# Patient Record
Sex: FEMALE | Race: BLACK OR AFRICAN AMERICAN | Employment: UNEMPLOYED | URBAN - METROPOLITAN AREA
[De-identification: names, ages, dates, MRNs, and addresses within clinical notes are randomized per-mention and may not be internally consistent; named-entity substitution may affect disease eponyms.]

---

## 2022-12-13 ENCOUNTER — OFFICE VISIT (OUTPATIENT)
Dept: INTERNAL MEDICINE CLINIC | Age: 27
End: 2022-12-13
Payer: MEDICAID

## 2022-12-13 VITALS
BODY MASS INDEX: 29.53 KG/M2 | RESPIRATION RATE: 18 BRPM | WEIGHT: 173 LBS | HEART RATE: 76 BPM | HEIGHT: 64 IN | TEMPERATURE: 97.5 F | SYSTOLIC BLOOD PRESSURE: 125 MMHG | DIASTOLIC BLOOD PRESSURE: 85 MMHG | OXYGEN SATURATION: 99 %

## 2022-12-13 DIAGNOSIS — Z11.59 ENCOUNTER FOR HEPATITIS C SCREENING TEST FOR LOW RISK PATIENT: ICD-10-CM

## 2022-12-13 DIAGNOSIS — Z23 NEEDS FLU SHOT: ICD-10-CM

## 2022-12-13 DIAGNOSIS — F41.8 DEPRESSION WITH ANXIETY: Primary | ICD-10-CM

## 2022-12-13 DIAGNOSIS — Z13.6 ENCOUNTER FOR SCREENING FOR CORONARY ARTERY DISEASE: ICD-10-CM

## 2022-12-13 PROCEDURE — 99203 OFFICE O/P NEW LOW 30 MIN: CPT | Performed by: INTERNAL MEDICINE

## 2022-12-13 PROCEDURE — 90686 IIV4 VACC NO PRSV 0.5 ML IM: CPT | Performed by: INTERNAL MEDICINE

## 2022-12-13 NOTE — PROGRESS NOTES
1. \"Have you been to the ER, urgent care clinic since your last visit? Hospitalized since your last visit? \" No    2. \"Have you seen or consulted any other health care providers outside of the [address] [place] [address] since your last visit? \" No     3. For patients aged 39-70: Has the patient had a colonoscopy / FIT/ Cologuard? NA - based on age      If the patient is female:    4. For patients aged 41-77: Has the patient had a mammogram within the past 2 years? NA - based on age or sex      11. For patients aged 21-65: Has the patient had a pap smear?  No

## 2022-12-13 NOTE — PROGRESS NOTES
HISTORY OF PRESENT ILLNESS  Joaquin Truong is a 32 y.o. female. Patient comes to establish PCP. Hx anxiety and depression on medications around 19years old. About 8 years ago she saw a therapist, CBT but did not follow up again. When asked about PMHx that would explain or contribute to her anxiety and depression, she is not sure. Hx depression and anxiety  Denied suicidal ideas or attempts. Always restless, hopeless, insomnia, low appetite. Not good concentration. Seasonal allergies  Claritin prn OTC. PAST MEDICAL HISTORY  Medical: depression and anxiety, seasonal allergies. Surgery: 4x c-sections. Allergies: iodine and shellfish. Meds: OTC claritin prn allergies. Work: Nurses aids. Family: father lung cancer/smoker, mother dialysis, HTN, strokes,, sister heart problem. Social: tobacco early teenage and stopped about 10 years ago, [place] 1 glass wine daily before bed, recreational drugs Marihuana daily. Sex:  and in other relation, 4 children, 1 partner male, STI trichomonas treated. Tubal ligation 2020. New Patient  Pertinent negatives include no chest pain and no shortness of breath. Establish Care  Pertinent negatives include no chest pain and no shortness of breath. Review of Systems   Constitutional:  Negative for chills and fever. HENT:  Negative for congestion. Respiratory:  Negative for cough and shortness of breath. Cardiovascular:  Negative for chest pain, palpitations and leg swelling. Visit Vitals  /85 (BP 1 Location: Left arm, BP Patient Position: Sitting, BP Cuff Size: Adult)   Pulse 76   Temp 97.5 °F (36.4 °C) (Temporal)   Resp 18   Ht 5' 4\" (1.626 m)   Wt 173 lb (78.5 kg)   LMP 12/13/2022 (Exact Date)   SpO2 99%   BMI 29.70 kg/m²     Physical Exam  Constitutional:       Appearance: Normal appearance. HENT:      Mouth/Throat:      Mouth: Mucous membranes are moist.      Pharynx: Oropharynx is clear.    Eyes:      Extraocular Movements: Extraocular movements intact. Conjunctiva/sclera: Conjunctivae normal.      Pupils: Pupils are equal, round, and reactive to light. Cardiovascular:      Rate and Rhythm: Normal rate and regular rhythm. Pulses: Normal pulses. Heart sounds: Normal heart sounds. Pulmonary:      Effort: Pulmonary effort is normal.      Breath sounds: Normal breath sounds. Abdominal:      General: Bowel sounds are normal.      Palpations: Abdomen is soft. Musculoskeletal:      Cervical back: Normal range of motion. Lymphadenopathy:      Cervical: No cervical adenopathy. Skin:     General: Skin is warm. Neurological:      Mental Status: She is alert and oriented to person, place, and time. Psychiatric:         Attention and Perception: Attention normal.         Mood and Affect: Mood normal. Mood is not anxious, depressed or elated. Affect is not labile, blunt, flat, angry, tearful or inappropriate. Speech: Speech is not delayed, slurred or tangential.         Thought Content: Thought content is not paranoid. Thought content does not include homicidal or suicidal ideation. Thought content does not include homicidal or suicidal plan. ASSESSMENT and PLAN    ICD-10-CM ICD-9-CM    1. Depression with anxiety  F41.8 300.4 REFERRAL TO PSYCHIATRY      2. Needs flu shot  Z23 V04.81 INFLUENZA, FLUARIX, FLULAVAL, FLUZONE (AGE 6 MO+), AFLURIA(AGE 3Y+) IM, PF, 0.5 ML      3. Encounter for hepatitis C screening test for low risk patient  Z11.59 V73.89 HEPATITIS C AB      4. Encounter for screening for coronary artery disease  Z13.6 V81.2 LIPID PANEL        Follow-up and Dispositions    Return in about 3 months (around 3/13/2023) for . Patient comes to establish PCP. Referral [facility] for depression and anxiety. PHQ-9 scored 19. Explained redflag, she would go to ED or call 911 if any present. FU in 3 months HM.

## 2022-12-13 NOTE — PATIENT INSTRUCTIONS
If having ideas of hurting yourself or other, or suicidal ideas, please call 911 or go to nearest ER.

## 2022-12-14 LAB
CHOLEST SERPL-MCNC: 126 MG/DL (ref 110–200)
HCV AB SER IA-ACNC: NORMAL
HDLC SERPL-MCNC: 3.1 MG/DL (ref 0–5)
HDLC SERPL-MCNC: 41 MG/DL
LDL/HDL RATIO,LDHD: 1.6
LDLC SERPL CALC-MCNC: 65 MG/DL (ref 50–99)
NON-HDL CHOLESTEROL, 011976: 85 MG/DL
TRIGL SERPL-MCNC: 98 MG/DL (ref 40–149)
VLDLC SERPL CALC-MCNC: 20 MG/DL (ref 8–30)

## 2023-05-17 ENCOUNTER — TELEPHONE (OUTPATIENT)
Facility: CLINIC | Age: 28
End: 2023-05-17

## 2023-06-07 ENCOUNTER — OFFICE VISIT (OUTPATIENT)
Facility: CLINIC | Age: 28
End: 2023-06-07
Payer: MEDICAID

## 2023-06-07 VITALS
TEMPERATURE: 97 F | DIASTOLIC BLOOD PRESSURE: 59 MMHG | OXYGEN SATURATION: 100 % | BODY MASS INDEX: 33.05 KG/M2 | HEIGHT: 64 IN | SYSTOLIC BLOOD PRESSURE: 107 MMHG | RESPIRATION RATE: 15 BRPM | HEART RATE: 90 BPM | WEIGHT: 193.6 LBS

## 2023-06-07 DIAGNOSIS — Z13.1 SCREENING FOR DIABETES MELLITUS (DM): ICD-10-CM

## 2023-06-07 DIAGNOSIS — Z13.6 ENCOUNTER FOR SCREENING FOR CORONARY ARTERY DISEASE: ICD-10-CM

## 2023-06-07 DIAGNOSIS — Z00.00 ANNUAL PHYSICAL EXAM: Primary | ICD-10-CM

## 2023-06-07 DIAGNOSIS — Z13.29 SCREENING FOR THYROID DISORDER: ICD-10-CM

## 2023-06-07 DIAGNOSIS — Z13.0 SCREENING FOR IRON DEFICIENCY ANEMIA: ICD-10-CM

## 2023-06-07 DIAGNOSIS — Z11.4 SCREENING FOR HIV (HUMAN IMMUNODEFICIENCY VIRUS): ICD-10-CM

## 2023-06-07 DIAGNOSIS — Z13.228 SCREENING FOR METABOLIC DISORDER: ICD-10-CM

## 2023-06-07 PROCEDURE — 99213 OFFICE O/P EST LOW 20 MIN: CPT | Performed by: INTERNAL MEDICINE

## 2023-06-07 RX ORDER — PRAZOSIN HYDROCHLORIDE 1 MG/1
1 CAPSULE ORAL NIGHTLY
COMMUNITY
Start: 2023-05-30

## 2023-06-07 RX ORDER — OXCARBAZEPINE 150 MG/1
150 TABLET, FILM COATED ORAL 2 TIMES DAILY
COMMUNITY
Start: 2023-05-30

## 2023-06-07 RX ORDER — BUSPIRONE HYDROCHLORIDE 15 MG/1
15 TABLET ORAL 3 TIMES DAILY
COMMUNITY
Start: 2023-05-30

## 2023-06-07 RX ORDER — HYDROXYZINE HYDROCHLORIDE 25 MG/1
25 TABLET, FILM COATED ORAL NIGHTLY
COMMUNITY
Start: 2023-05-30

## 2023-06-07 SDOH — ECONOMIC STABILITY: FOOD INSECURITY: WITHIN THE PAST 12 MONTHS, YOU WORRIED THAT YOUR FOOD WOULD RUN OUT BEFORE YOU GOT MONEY TO BUY MORE.: NEVER TRUE

## 2023-06-07 SDOH — ECONOMIC STABILITY: INCOME INSECURITY: HOW HARD IS IT FOR YOU TO PAY FOR THE VERY BASICS LIKE FOOD, HOUSING, MEDICAL CARE, AND HEATING?: NOT VERY HARD

## 2023-06-07 SDOH — ECONOMIC STABILITY: FOOD INSECURITY: WITHIN THE PAST 12 MONTHS, THE FOOD YOU BOUGHT JUST DIDN'T LAST AND YOU DIDN'T HAVE MONEY TO GET MORE.: NEVER TRUE

## 2023-06-07 SDOH — ECONOMIC STABILITY: HOUSING INSECURITY
IN THE LAST 12 MONTHS, WAS THERE A TIME WHEN YOU DID NOT HAVE A STEADY PLACE TO SLEEP OR SLEPT IN A SHELTER (INCLUDING NOW)?: NO

## 2023-06-07 ASSESSMENT — PATIENT HEALTH QUESTIONNAIRE - PHQ9: DEPRESSION UNABLE TO ASSESS: PT REFUSES

## 2023-06-07 ASSESSMENT — ENCOUNTER SYMPTOMS
SHORTNESS OF BREATH: 0
CONSTIPATION: 0
ABDOMINAL PAIN: 0
DIARRHEA: 0
COUGH: 0

## 2023-06-07 NOTE — PROGRESS NOTES
Subjective:      Patient ID: Horace Ayoub is a 25 y.o. female. Annual exam    Patient coming today for annual physical exam.  He did establish with behavioral care and psychiatric services in [place]. He has no other medications for anxiety and depression. He follows up regularly. He feels improvement of depression symptoms. She denied suicidal ideas of intention of hurting herself. She had no other complaints or concerns today. She denies fever chills or chest pain or shortness of breath. Anxiety and depression  [facility] in [place]. Denied suicidal ideas or attempts. OXcarbazepine (TRILEPTAL) 150 MG tablet, Take 1 tablet by mouth 2 times daily  prazosin (MINIPRESS) 1 MG capsule, Take 1 capsule by mouth at bedtime 3 tabs at bedtime  hydrOXYzine HCl (ATARAX) 25 MG tablet, Take 1 tablet by mouth at bedtime 1-2 tablets at bedtime prn  busPIRone (BUSPAR) 15 MG tablet, Take 15 mg by mouth 3 times daily    Seasonal allergies  Claritin prn OTC. PAST MEDICAL HISTORY  Medical: depression and anxiety, seasonal allergies. Surgery: 4x c-sections. Allergies: iodine and shellfish. Meds: OTC claritin prn allergies. Work: Nurses aids. Family: father lung cancer/smoker, mother dialysis, HTN, strokes,, sister heart problem. Social: tobacco early teenage and stopped about 10 years ago, [place] 1 glass wine daily before bed, recreational drugs Marihuana daily. Sex:  and in other relation, 4 children, 1 partner male, STI trichomonas treated. Tubal ligation 2020. Review of Systems   Constitutional:  Negative for chills and fever. HENT:  Negative for sneezing. Respiratory:  Negative for cough and shortness of breath. Cardiovascular:  Negative for chest pain and palpitations. Gastrointestinal:  Negative for abdominal pain, constipation and diarrhea.      Objective:   Visit Vitals  BP (!) 107/59   Pulse 90   Temp 97 °F (36.1 °C) (Temporal)   Resp 15   Ht 5' 4\" (1.626

## 2023-06-07 NOTE — PROGRESS NOTES
Cesario Clay is a 25 y.o.  female presents today for office visit for   Chief Complaint   Patient presents with    Health Maintenance     1. \"Have you been to the ER, urgent care clinic since your last visit? Hospitalized since your last visit? \" No    2. \"Have you seen or consulted any other health care providers outside of the [address] [place] [address] since your last visit? \" Seeing [facility]     3. For patients aged 39-70: Has the patient had a colonoscopy / FIT/ Cologuard? No     If the patient is female:    4. For patients aged 41-77: Has the patient had a mammogram within the past 2 years? N/A    5. For patients aged 21-65: Has the patient had a pap smear?  No

## 2024-02-06 ENCOUNTER — OFFICE VISIT (OUTPATIENT)
Facility: CLINIC | Age: 29
End: 2024-02-06
Payer: MEDICAID

## 2024-02-06 VITALS
OXYGEN SATURATION: 100 % | RESPIRATION RATE: 12 BRPM | TEMPERATURE: 97 F | HEIGHT: 64 IN | SYSTOLIC BLOOD PRESSURE: 109 MMHG | BODY MASS INDEX: 33.8 KG/M2 | DIASTOLIC BLOOD PRESSURE: 73 MMHG | WEIGHT: 198 LBS | HEART RATE: 95 BPM

## 2024-02-06 DIAGNOSIS — F41.9 ANXIETY: Primary | ICD-10-CM

## 2024-02-06 DIAGNOSIS — J02.9 SORE THROAT: ICD-10-CM

## 2024-02-06 LAB
GROUP A STREP ANTIGEN, POC: NORMAL
QUICKVUE INFLUENZA TEST: NORMAL
VALID INTERNAL CONTROL, POC: NEGATIVE
VALID INTERNAL CONTROL, POC: NEGATIVE

## 2024-02-06 PROCEDURE — 99213 OFFICE O/P EST LOW 20 MIN: CPT | Performed by: INTERNAL MEDICINE

## 2024-02-06 PROCEDURE — 87880 STREP A ASSAY W/OPTIC: CPT | Performed by: INTERNAL MEDICINE

## 2024-02-06 PROCEDURE — 87804 INFLUENZA ASSAY W/OPTIC: CPT | Performed by: INTERNAL MEDICINE

## 2024-02-06 RX ORDER — CLONAZEPAM 1 MG/1
1 TABLET ORAL DAILY PRN
Qty: 30 TABLET | Refills: 0 | Status: SHIPPED | OUTPATIENT
Start: 2024-02-06 | End: 2024-03-07

## 2024-02-06 RX ORDER — ARIPIPRAZOLE 10 MG/1
10 TABLET ORAL DAILY
COMMUNITY
Start: 2024-01-13 | End: 2024-02-12

## 2024-02-06 RX ORDER — BUPROPION HYDROCHLORIDE 300 MG/1
300 TABLET ORAL DAILY
COMMUNITY
Start: 2024-01-09 | End: 2024-02-08

## 2024-02-06 RX ORDER — CLONAZEPAM 1 MG/1
1 TABLET ORAL DAILY
COMMUNITY
Start: 2024-01-12 | End: 2024-02-06 | Stop reason: SDUPTHER

## 2024-02-06 ASSESSMENT — ENCOUNTER SYMPTOMS
SHORTNESS OF BREATH: 0
ABDOMINAL PAIN: 0

## 2024-02-06 ASSESSMENT — PATIENT HEALTH QUESTIONNAIRE - PHQ9
5. POOR APPETITE OR OVEREATING: 0
7. TROUBLE CONCENTRATING ON THINGS, SUCH AS READING THE NEWSPAPER OR WATCHING TELEVISION: 1
SUM OF ALL RESPONSES TO PHQ QUESTIONS 1-9: 10
4. FEELING TIRED OR HAVING LITTLE ENERGY: 3
9. THOUGHTS THAT YOU WOULD BE BETTER OFF DEAD, OR OF HURTING YOURSELF: 0
SUM OF ALL RESPONSES TO PHQ QUESTIONS 1-9: 10
10. IF YOU CHECKED OFF ANY PROBLEMS, HOW DIFFICULT HAVE THESE PROBLEMS MADE IT FOR YOU TO DO YOUR WORK, TAKE CARE OF THINGS AT HOME, OR GET ALONG WITH OTHER PEOPLE: 2
SUM OF ALL RESPONSES TO PHQ9 QUESTIONS 1 & 2: 3
3. TROUBLE FALLING OR STAYING ASLEEP: 2
SUM OF ALL RESPONSES TO PHQ QUESTIONS 1-9: 10
1. LITTLE INTEREST OR PLEASURE IN DOING THINGS: 0
SUM OF ALL RESPONSES TO PHQ QUESTIONS 1-9: 10
6. FEELING BAD ABOUT YOURSELF - OR THAT YOU ARE A FAILURE OR HAVE LET YOURSELF OR YOUR FAMILY DOWN: 1
8. MOVING OR SPEAKING SO SLOWLY THAT OTHER PEOPLE COULD HAVE NOTICED. OR THE OPPOSITE, BEING SO FIGETY OR RESTLESS THAT YOU HAVE BEEN MOVING AROUND A LOT MORE THAN USUAL: 0

## 2024-02-06 NOTE — PROGRESS NOTES
\"Have you been to the ER, urgent care clinic since your last visit?  Hospitalized since your last visit?\"    NO    “Have you seen or consulted any other health care providers outside of [facility] since your last visit?”    NO        “Have you had a pap smear?”    Patient is in office today for pap smear.       
Right cervical: No superficial, deep or posterior cervical adenopathy.     Left cervical: No superficial, deep or posterior cervical adenopathy.   Neurological:      Mental Status: She is alert.         Assessment:       ICD-10-CM    1. Anxiety  F41.9 clonazePAM (KLONOPIN) 1 MG tablet      2. Sore throat  J02.9 AMB POC RAPID STREP A     AMB POC RAPID INFLUENZA TEST              Plan:   Return in about 3 months (around 5/6/2024) for Pap smear/women's health.      Royce Méndez MD

## 2024-07-31 ENCOUNTER — OFFICE VISIT (OUTPATIENT)
Facility: CLINIC | Age: 29
End: 2024-07-31
Payer: MEDICAID

## 2024-07-31 VITALS
SYSTOLIC BLOOD PRESSURE: 100 MMHG | WEIGHT: 190.4 LBS | HEIGHT: 64 IN | RESPIRATION RATE: 16 BRPM | HEART RATE: 87 BPM | TEMPERATURE: 97.5 F | OXYGEN SATURATION: 99 % | BODY MASS INDEX: 32.5 KG/M2 | DIASTOLIC BLOOD PRESSURE: 69 MMHG

## 2024-07-31 DIAGNOSIS — K21.00 GASTROESOPHAGEAL REFLUX DISEASE WITH ESOPHAGITIS, UNSPECIFIED WHETHER HEMORRHAGE: ICD-10-CM

## 2024-07-31 DIAGNOSIS — Z09 HOSPITAL DISCHARGE FOLLOW-UP: Primary | ICD-10-CM

## 2024-07-31 DIAGNOSIS — R11.2 NAUSEA AND VOMITING, UNSPECIFIED VOMITING TYPE: ICD-10-CM

## 2024-07-31 PROCEDURE — 99214 OFFICE O/P EST MOD 30 MIN: CPT | Performed by: INTERNAL MEDICINE

## 2024-07-31 PROCEDURE — 1111F DSCHRG MED/CURRENT MED MERGE: CPT | Performed by: INTERNAL MEDICINE

## 2024-07-31 RX ORDER — ARIPIPRAZOLE 5 MG/1
5 TABLET ORAL EVERY MORNING
COMMUNITY
Start: 2024-07-16

## 2024-07-31 RX ORDER — FAMOTIDINE 20 MG/1
20 TABLET, FILM COATED ORAL DAILY PRN
Qty: 60 TABLET | Refills: 0 | Status: SHIPPED | OUTPATIENT
Start: 2024-07-31

## 2024-07-31 RX ORDER — OXCARBAZEPINE 300 MG/1
300 TABLET, FILM COATED ORAL 2 TIMES DAILY
COMMUNITY
Start: 2024-07-16

## 2024-07-31 RX ORDER — PRAZOSIN HYDROCHLORIDE 2 MG/1
2 CAPSULE ORAL NIGHTLY
COMMUNITY
Start: 2024-07-27

## 2024-07-31 RX ORDER — ONDANSETRON 4 MG/1
4 TABLET, FILM COATED ORAL DAILY PRN
Qty: 15 TABLET | Refills: 0 | Status: SHIPPED | OUTPATIENT
Start: 2024-07-31

## 2024-07-31 RX ORDER — BUPROPION HYDROCHLORIDE 150 MG/1
150 TABLET ORAL EVERY MORNING
COMMUNITY
Start: 2024-07-16 | End: 2024-07-31

## 2024-07-31 SDOH — ECONOMIC STABILITY: HOUSING INSECURITY
IN THE LAST 12 MONTHS, WAS THERE A TIME WHEN YOU DID NOT HAVE A STEADY PLACE TO SLEEP OR SLEPT IN A SHELTER (INCLUDING NOW)?: YES

## 2024-07-31 SDOH — ECONOMIC STABILITY: FOOD INSECURITY: WITHIN THE PAST 12 MONTHS, YOU WORRIED THAT YOUR FOOD WOULD RUN OUT BEFORE YOU GOT MONEY TO BUY MORE.: NEVER TRUE

## 2024-07-31 SDOH — ECONOMIC STABILITY: FOOD INSECURITY: WITHIN THE PAST 12 MONTHS, THE FOOD YOU BOUGHT JUST DIDN'T LAST AND YOU DIDN'T HAVE MONEY TO GET MORE.: NEVER TRUE

## 2024-07-31 SDOH — ECONOMIC STABILITY: INCOME INSECURITY: HOW HARD IS IT FOR YOU TO PAY FOR THE VERY BASICS LIKE FOOD, HOUSING, MEDICAL CARE, AND HEATING?: NOT HARD AT ALL

## 2024-07-31 ASSESSMENT — ENCOUNTER SYMPTOMS: SHORTNESS OF BREATH: 0

## 2024-07-31 NOTE — PROGRESS NOTES
MG tablet, Take 1 tablet by mouth at bedtime 1-2 tablets at bedtime prn  busPIRone (BUSPAR) 15 MG tablet, Take 15 mg by mouth 3 times daily  Last appt with pysch 01/07/2024.  Next appt with psych on 02/22/2024.    Seasonal allergies  Claritin prn OTC.        PAST MEDICAL HISTORY  Medical: depression and anxiety, seasonal allergies.  Surgery: 4x c-sections.  Allergies: iodine and shellfish.  Meds: OTC claritin prn allergies.  Work: Nurses aids.  Family: father lung cancer/smoker, mother dialysis, HTN, strokes,, sister heart problem.  Social: tobacco early teenage and stopped about 10 years ago, [place] 1 glass wine daily before bed, recreational drugs Marihuana daily.  Sex:  and in other relation, 4 children, 1 partner male, STI trichomonas treated. Tubal ligation 2020.      Anxiety  Patient reports no chest pain or shortness of breath.           Review of Systems   Respiratory:  Negative for shortness of breath.    Cardiovascular:  Negative for chest pain.       Objective:   Visit Vitals  /69   Pulse 87   Temp 97.5 °F (36.4 °C) (Temporal)   Resp 16   Ht 1.626 m (5' 4\")   Wt 86.4 kg (190 lb 6.4 oz)   SpO2 99%   BMI 32.68 kg/m²        Physical Exam  Cardiovascular:      Rate and Rhythm: Normal rate and regular rhythm.   Pulmonary:      Effort: Pulmonary effort is normal.      Breath sounds: Normal breath sounds.   Abdominal:      General: Abdomen is flat. Bowel sounds are normal.      Tenderness: There is no abdominal tenderness. There is no right CVA tenderness, left CVA tenderness, guarding or rebound. Negative signs include Watts's sign, Rovsing's sign and McBurney's sign.   Neurological:      Mental Status: She is alert.         Assessment:       ICD-10-CM    1. Hospital discharge follow-up  Z09 PA DISCHARGE MEDS RECONCILED W/ CURRENT OUTPATIENT MED LIST      2. Nausea and vomiting, unspecified vomiting type  R11.2 ondansetron (ZOFRAN) 4 MG tablet      3. Gastroesophageal reflux disease with

## 2024-07-31 NOTE — PATIENT INSTRUCTIONS
[facility] Housing Resources*      For emergency housing call Housing Hotlines:   [facility] Housing Crisis Hotline [phone] (ForKids)      [facility]  [address], [place] [address] [phone]  Helpful Info:  [facility] coordinates with other organizations to utilize supportive services to move homeless individuals from dependency to self-sufficiency.   No same day service. Must call to complete pre-intake screening with staff member. There is a current waitlist for program. Shower and laundry available Monday-Friday.     [facility] [phone]  [address] [address]  Helpful info: Shelters provide food, shelters, resources, prayers for men in the [facility]    [facility]  Supportive housing program for homeless women and children.   Must contact Regional Crisis Hotline at [phone] for information on program.   WEBSITE: [email or web address]  What they offer: Transitional housing for single mothers and pregnant women 18-35.   Website: [email or web address]://[email or web address]/need-help/  To apply: click on link above to answer a few questions or call [phone]  [address] [address] [phone]   Helpful Info: Open 24/7, 365 days a year. Emergency shelter for Men, Single Women, and Women with children.  Residents receive 3 meals a day, clothing, toiletries, shower and laundry services, case management, and counseling. Call for bed availability.     [facility]  [address] [place] [address] [phone]  Website: [email or web address]/  Helpful Info: Referrals are ONLY accepted through the Housing Crisis Line ([phone]). Limited space/beds available.  Their program provides short-term subsidized shelter,

## 2024-08-14 DIAGNOSIS — K21.00 GASTROESOPHAGEAL REFLUX DISEASE WITH ESOPHAGITIS, UNSPECIFIED WHETHER HEMORRHAGE: ICD-10-CM

## 2024-08-15 RX ORDER — FAMOTIDINE 20 MG/1
20 TABLET, FILM COATED ORAL DAILY PRN
Qty: 90 TABLET | Refills: 1 | Status: SHIPPED | OUTPATIENT
Start: 2024-08-15